# Patient Record
Sex: FEMALE | Race: WHITE | NOT HISPANIC OR LATINO | Employment: FULL TIME | URBAN - METROPOLITAN AREA
[De-identification: names, ages, dates, MRNs, and addresses within clinical notes are randomized per-mention and may not be internally consistent; named-entity substitution may affect disease eponyms.]

---

## 2017-02-17 ENCOUNTER — TELEPHONE (OUTPATIENT)
Dept: VASCULAR LAB | Facility: MEDICAL CENTER | Age: 47
End: 2017-02-17

## 2017-02-17 DIAGNOSIS — D68.8 FACTOR V AND FACTOR VIII DEFICIENCY (HCC): ICD-10-CM

## 2017-02-17 NOTE — TELEPHONE ENCOUNTER
Left voicemail message for patient to return call to [facility] to establish care      Pt will use [facility], as she lives in [place]  SO faxed.  Pt prefers [facility] in [place]  Pt is interested in home monitor in 90 days  Carmel Pino PHARMD      Pt will start warfarin 7.5mg po daily and test in 4 days  Carmel Pino PHARMD